# Patient Record
Sex: FEMALE | Race: OTHER | Employment: UNEMPLOYED | URBAN - METROPOLITAN AREA
[De-identification: names, ages, dates, MRNs, and addresses within clinical notes are randomized per-mention and may not be internally consistent; named-entity substitution may affect disease eponyms.]

---

## 2019-08-09 NOTE — PATIENT DISCUSSION
Barbara GONSALEZ --Reviewed that growth is caused by the cumulative effect of sun exposure over time and that it does not extend on to the cornea. Recommend UV protection to prevent progression and artificial tears prn for comfort. Return for continued monitoring.

## 2019-08-09 NOTE — PATIENT DISCUSSION
1.  Dry Eye OU: Start Pred Forte ou QID 2. Pinguecula OU --Reviewed that growth is caused by the cumulative effect of sun exposure over time and that it does not extend on to the cornea. Recommend UV protection to prevent progression and artificial tears prn for comfort. Return for continued monitoring. Consider punctal plugs3. Nuclear Sclerotic Cataract OU: Explained Consider how cataracts can effect vision. Recommend clinical observation. The patient was advised to contact us if any change or worsening of vision. Return for an appointment in 1 week for office call. with Dr. Terence Rhodes.

## 2019-08-19 NOTE — PATIENT DISCUSSION
1.  Dry Eye OU: Improved Stop Pred Forte ou QID. 2. Pinguecula OU --Reviewed that growth is caused by the cumulative effect of sun exposure over time and that it does not extend on to the cornea. Recommend UV protection to prevent progression and artificial tears prn for comfort. Return for continued monitoring. CONSIDER PINGUECULA EXCISION IF SYMPTOMATIC3. Nuclear Sclerotic Cataract OU: Explained Consider how cataracts can effect vision. Recommend clinical observation. The patient was advised to contact us if any change or worsening of vision. Return for an appointment in 6 months for comprehensive exam. with Dr. Sheryl Landin.

## 2020-05-12 NOTE — PATIENT DISCUSSION
1.  Dry Eye OU: MMP-9 positive restart PRED 2x/d for a week then decrease to prn then Start Xiidra BID. Discussed that Restasis helps to increase the production of the patient's own tears and therefore can take 3-4 months for an improvement in symptoms. Use AT 4x/d2. Pinguecula OU --Reviewed that growth is caused by the cumulative effect of sun exposure over time and that it does not extend on to the cornea. Recommend UV protection to prevent progression and artificial tears prn for comfort. Return for continued monitoring. 3. PVD OS: Patient was cautioned to call our office immediately if they experience a substantial change in their symptoms such as an increase in floaters persistent flashes loss of visual field (may appear as a shadow or a curtain) or decrease in visual acuity as these may indicate a retinal tear or detachment. If this is a new problem patient will need to return for re-examination  as determined by the [facility] 10. Combined Types of Cataract OU: Explained how cataracts can effect vision. Recommend clinical observation. The patient was advised to contact us if any change or worsening of vision. 5. Return for an appointment in 6 weeks for office call. Tear Osmolarity. with Dr. Denver Curb.

## 2020-06-23 NOTE — PATIENT DISCUSSION
Keratoconjunctivitis Sicca OU:  tear osmolarity improving responding to Doraine Shell. Continue current management. taper when better in 2monthsReturn for an appointment in 3 months for office call. with Dr. Vanessa Mccall.

## 2022-04-27 ENCOUNTER — NEW PATIENT (OUTPATIENT)
Dept: URBAN - METROPOLITAN AREA CLINIC 25 | Facility: CLINIC | Age: 62
End: 2022-04-27

## 2022-04-27 DIAGNOSIS — H52.4: ICD-10-CM

## 2022-04-27 DIAGNOSIS — H25.13: ICD-10-CM

## 2022-04-27 PROCEDURE — 92004 COMPRE OPH EXAM NEW PT 1/>: CPT

## 2022-04-27 PROCEDURE — 92015 DETERMINE REFRACTIVE STATE: CPT

## 2022-04-27 ASSESSMENT — KERATOMETRY
OS_AXISANGLE_DEGREES: 7
OD_AXISANGLE2_DEGREES: 89
OS_K1POWER_DIOPTERS: 44.00
OS_K2POWER_DIOPTERS: 44.75
OD_K1POWER_DIOPTERS: 43.75
OS_AXISANGLE2_DEGREES: 97
OD_K2POWER_DIOPTERS: 44.50
OD_AXISANGLE_DEGREES: 179

## 2022-04-27 ASSESSMENT — TONOMETRY
OS_IOP_MMHG: 18
OD_IOP_MMHG: 18

## 2022-04-27 ASSESSMENT — VISUAL ACUITY
OD_CC: 20/25
OS_CC: 20/25